# Patient Record
Sex: FEMALE | Race: OTHER | Employment: UNEMPLOYED | URBAN - METROPOLITAN AREA
[De-identification: names, ages, dates, MRNs, and addresses within clinical notes are randomized per-mention and may not be internally consistent; named-entity substitution may affect disease eponyms.]

---

## 2021-04-01 ENCOUNTER — HOSPITAL ENCOUNTER (INPATIENT)
Age: 21
LOS: 7 days | Discharge: HOME | DRG: 751 | End: 2021-04-08
Attending: PSYCHIATRY & NEUROLOGY | Admitting: PSYCHIATRY & NEUROLOGY
Payer: COMMERCIAL

## 2021-04-01 DIAGNOSIS — F33.2 SEVERE EPISODE OF RECURRENT MAJOR DEPRESSIVE DISORDER, WITHOUT PSYCHOTIC FEATURES (HCC): ICD-10-CM

## 2021-04-01 PROBLEM — F32.A DEPRESSION: Status: ACTIVE | Noted: 2021-04-01

## 2021-04-01 PROCEDURE — 65220000003 HC RM SEMIPRIVATE PSYCH

## 2021-04-01 PROCEDURE — 74011000250 HC RX REV CODE- 250: Performed by: PSYCHIATRY & NEUROLOGY

## 2021-04-01 PROCEDURE — 74011250637 HC RX REV CODE- 250/637: Performed by: PSYCHIATRY & NEUROLOGY

## 2021-04-01 RX ORDER — HYDROXYZINE 25 MG/1
50 TABLET, FILM COATED ORAL
Status: DISCONTINUED | OUTPATIENT
Start: 2021-04-01 | End: 2021-04-08 | Stop reason: HOSPADM

## 2021-04-01 RX ORDER — HALOPERIDOL 5 MG/ML
5 INJECTION INTRAMUSCULAR
Status: DISCONTINUED | OUTPATIENT
Start: 2021-04-01 | End: 2021-04-08 | Stop reason: HOSPADM

## 2021-04-01 RX ORDER — OLANZAPINE 5 MG/1
5 TABLET ORAL
Status: DISCONTINUED | OUTPATIENT
Start: 2021-04-01 | End: 2021-04-08 | Stop reason: HOSPADM

## 2021-04-01 RX ORDER — LORAZEPAM 2 MG/ML
1 INJECTION INTRAMUSCULAR
Status: DISCONTINUED | OUTPATIENT
Start: 2021-04-01 | End: 2021-04-08 | Stop reason: HOSPADM

## 2021-04-01 RX ORDER — ACETAMINOPHEN 325 MG/1
650 TABLET ORAL
Status: DISCONTINUED | OUTPATIENT
Start: 2021-04-01 | End: 2021-04-08 | Stop reason: HOSPADM

## 2021-04-01 RX ORDER — BENZTROPINE MESYLATE 1 MG/1
1 TABLET ORAL
Status: DISCONTINUED | OUTPATIENT
Start: 2021-04-01 | End: 2021-04-08 | Stop reason: HOSPADM

## 2021-04-01 RX ORDER — DIPHENHYDRAMINE HYDROCHLORIDE 50 MG/ML
50 INJECTION, SOLUTION INTRAMUSCULAR; INTRAVENOUS
Status: DISCONTINUED | OUTPATIENT
Start: 2021-04-01 | End: 2021-04-08 | Stop reason: HOSPADM

## 2021-04-01 RX ORDER — TRAZODONE HYDROCHLORIDE 50 MG/1
50 TABLET ORAL
Status: DISCONTINUED | OUTPATIENT
Start: 2021-04-01 | End: 2021-04-08 | Stop reason: HOSPADM

## 2021-04-01 RX ORDER — ADHESIVE BANDAGE
30 BANDAGE TOPICAL DAILY PRN
Status: DISCONTINUED | OUTPATIENT
Start: 2021-04-01 | End: 2021-04-08 | Stop reason: HOSPADM

## 2021-04-01 RX ADMIN — BACITRACIN ZINC NEOMYCIN SULFATE POLYMYXIN B SULFATE: 400; 3.5; 5 OINTMENT TOPICAL at 17:27

## 2021-04-01 RX ADMIN — HYDROXYZINE HYDROCHLORIDE 50 MG: 25 TABLET, FILM COATED ORAL at 23:04

## 2021-04-01 RX ADMIN — ACETAMINOPHEN 650 MG: 325 TABLET ORAL at 12:38

## 2021-04-01 RX ADMIN — TRAZODONE HYDROCHLORIDE 50 MG: 50 TABLET ORAL at 23:04

## 2021-04-01 RX ADMIN — ACETAMINOPHEN 650 MG: 325 TABLET ORAL at 21:46

## 2021-04-01 NOTE — PROGRESS NOTES
GROUP THERAPY PROGRESS NOTE      Fnu Fouzia Bunch was not present for medication group.       Nasir Garcia, PHARMD, BCPS

## 2021-04-01 NOTE — PROGRESS NOTES
[facility] Admission Pharmacy Medication Reconciliation     Information obtained from: Patient interview  RxQuery data available1:No    Comments/recommendations:    1) Reports she used to take vitamin D and fish oil, unknown doses. Has not taken them recently. 2) Medication changes (since last review): None     1RxQuery pharmacy benefit data reflects medications filled and processed through the patient's insurance, however                this data does NOT capture whether the medication was picked up or is currently being taken by the patient. Total Time Spent: 5 minutes    Past Medical History/Disease States:  No past medical history on file. Patient allergies:    Allergies as of 04/01/2021    (No Known Allergies)       Prior to admission medications:   None        Thank you,  [facility], [address] [place] [address] Avenue Nw: [phone] ([id number])  Pharmacy: [phone] ([id number])

## 2021-04-01 NOTE — GROUP NOTE
DANA  GROUP DOCUMENTATION INDIVIDUAL Group Therapy Note Date: 4/1/2021 Group Start Time: 1100 Group End Time: 1200 Group Topic: Topic Group [facility] [place] 3 ACUTE BEHAV [facility] [place], [address] [facility] GROUP DOCUMENTATION GROUP Group Therapy Note Attendees: 6 Attendance: Did not attend Patient's Goal: Interventions/techniques: 
Cindi Meneses
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
## 2021-04-01 NOTE — GROUP NOTE
DANA  GROUP DOCUMENTATION INDIVIDUAL Group Therapy Note Date: 4/1/2021 Group Start Time: 1500 Group End Time: 5348 Group Topic: Recreational/Music Therapy [facility] [place] [address] ACUTE BEHAV [facility], [address] GROUP DOCUMENTATION GROUP Group Therapy Note Attendees: 6 Attendance: Attended Patient's Goal:  To concentrate on selected task Interventions/techniques: Supported-crafts,games,music Follows Directions: Followed directions Interactions: minimal-pleasant upon approach Mental Status: Calm Behavior/appearance: Attentive, Cooperative and Needed prompting Goals Achieved: Actively participated with encouragement Additional Notes:   
 
Amie Painting

## 2021-04-01 NOTE — GROUP NOTE
DANA  GROUP DOCUMENTATION INDIVIDUAL Group Therapy Note Date: 4/1/2021 Group Start Time: 0900 Group End Time: 1000 Group Topic: Topic Group [facility] [place] 3 ACUTE BEHAV [facility] [place], [address] GROUP DOCUMENTATION GROUP Group Therapy Note Attendees: 4 Attendance: Did not attend Patient's Goal: Interventions/techniques:  
Gautam Gould

## 2021-04-01 NOTE — CONSULTS
Hospitalist Consultation Note    NAME:  Sharmila Diaz   :   2000   MRN:   [id number]   Room Number: 030  @ [facility]     ATTENDING: Sadie Moreno MD  PCP:  Rachel Baltazar MD    Date/Time:  2021 4:24 PM      Recommendations/Plan:       Active Problems:    Depression (2021)         #Chart review from care everywhere admit he was in hospital  -CBC without any white count hemoglobin and hemoglobin stable  -UDS negative  -General chemistry unremarkable  -Serum pregnancy is negative  -Alcohol less than less than 10  -UA with small blood and? Bacteria with epithelial cells suspected a good sample recommend repeating it  -EKG result not available but      #Depression with suicidal ideation    -Psychiatric treatment and management of health issues,Defer to psychiatrist for further management.  -Continue home meds. -Medically stable at this time. We will follow up on a p.r.n. basis.  -No VTE prophylaxis indicated or warranted at this time. Subjective:   REQUESTING PHYSICIAN:  REASON FOR CONSULT:      Fnu is a 23 y.o.  American female who I was asked to see for medical clearance. Per chart review patient presented to [facility] ED via father due to intense has ideation bedside kill self. Patient per chart review seems to feel very useless and it stating **it is a hole that is deeper and deeper with life being painful due to her observing all the negative things in life**    Patient denied any active chest pain shortness of breath belly pain fever cough history of drug use            No past medical history on file. No past surgical history on file. Social History     Tobacco Use    Smoking status: Not on file   Substance Use Topics    Alcohol use: Not on file      No family history on file.     No Known Allergies   Prior to Admission medications    Not on File       REVIEW OF SYSTEMS:     Total of 12 systems reviewed as follows: I am not able to complete the review of systems because: The patient is intubated and sedated    The patient has altered mental status due to his acute medical problems    The patient has baseline aphasia from prior stroke(s)    The patient has baseline dementia and is not reliable historian                 POSITIVE= underlined text  Negative = text not underlined  General:  fever, chills, sweats, generalized weakness, weight loss/gain,      loss of appetite   Eyes:    blurred vision, eye pain, loss of vision, double vision  ENT:    rhinorrhea, pharyngitis   Respiratory:   cough, sputum production, SOB, wheezing, MARIE, pleuritic pain   Cardiology:   chest pain, palpitations, orthopnea, PND, edema, syncope   Gastrointestinal:  abdominal pain , N/V, dysphagia, diarrhea, constipation, bleeding   Genitourinary:  frequency, urgency, dysuria, hematuria, incontinence   Muskuloskeletal :  arthralgia, myalgia   Hematology:  easy bruising, nose or gum bleeding, lymphadenopathy   Dermatological: rash, ulceration, pruritis   Endocrine:   hot flashes or polydipsia   Neurological:  headache, dizziness, confusion, focal weakness, paresthesia,     Speech difficulties, memory loss, gait disturbance  Psychological: Feelings of anxiety, depression, agitation    Objective:   VITALS:    Visit Vitals  /78 (BP 1 Location: Left upper arm, BP Patient Position: At rest)   Pulse 80   Temp 98 °F (36.7 °C)   Resp 18   SpO2 99%     Temp (24hrs), Av °F (36.7 °C), Min:98 °F (36.7 °C), Max:98 °F (36.7 °C)      PHYSICAL EXAM:   General:    Alert, cooperative, no distress, appears stated age. HEENT: Atraumatic, anicteric sclerae, pink conjunctivae     No oral ulcers, mucosa moist, throat clear  Neck:  Supple, symmetrical,  thyroid: non tender  Lungs:   Clear to auscultation bilaterally. No Wheezing or Rhonchi. No rales. Chest wall:  No tenderness  No Accessory muscle use.   Heart:   Regular  rhythm,  No  murmur   No edema  Abdomen: Soft, non-tender. Not distended. Bowel sounds normal  Extremities: No cyanosis. No clubbing  Skin:     Not pale. Not Jaundiced  No rashes   Psych:   Not anxious or agitated. Neurologic: EOMs intact. No facial asymmetry. No aphasia or slurred speech. Symmetrical strength, Alert and oriented X 4.     _______________________________________________________________________  Care Plan discussed with:    Comments   Patient x    Family      RN x    Care Manager                    Consultant:      ____________________________________________________________________  TOTAL TIME:     15 mins    Comments    x Reviewed previous records   >50% of visit spent in counseling and coordination of care x Discussion with patient and/or family and questions answered       Critical Care Provided     Minutes non procedure based  ________________________________________________________________________  Signed: Malgorzata Loomis MD      Procedures: see electronic medical records for all procedures/Xrays and details which were not copied into this note but were reviewed prior to creation of Plan. LAB DATA REVIEWED:    No results found for this or any previous visit (from the past 24 hour(s)).     _____________________________  Hospitalist: Malgorzata Loomis MD
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
## 2021-04-02 LAB
CHOLEST SERPL-MCNC: 170 MG/DL
GLUCOSE P FAST SERPL-MCNC: 84 MG/DL (ref 65–100)
HDLC SERPL-MCNC: 84 MG/DL
HDLC SERPL: 2 {RATIO} (ref 0–5)
LDLC SERPL CALC-MCNC: 78.4 MG/DL (ref 0–100)
LIPID PROFILE,FLP: NORMAL
TRIGL SERPL-MCNC: 38 MG/DL (ref ?–150)
TSH SERPL DL<=0.05 MIU/L-ACNC: 1.84 UIU/ML (ref 0.36–3.74)
VLDLC SERPL CALC-MCNC: 7.6 MG/DL

## 2021-04-02 PROCEDURE — 74011250637 HC RX REV CODE- 250/637: Performed by: PSYCHIATRY & NEUROLOGY

## 2021-04-02 PROCEDURE — 65220000003 HC RM SEMIPRIVATE PSYCH

## 2021-04-02 PROCEDURE — 84443 ASSAY THYROID STIM HORMONE: CPT

## 2021-04-02 PROCEDURE — 82947 ASSAY GLUCOSE BLOOD QUANT: CPT

## 2021-04-02 PROCEDURE — 80061 LIPID PANEL: CPT

## 2021-04-02 PROCEDURE — 36415 COLL VENOUS BLD VENIPUNCTURE: CPT

## 2021-04-02 RX ORDER — LAMOTRIGINE 25 MG/1
25 TABLET ORAL DAILY
Status: DISCONTINUED | OUTPATIENT
Start: 2021-04-02 | End: 2021-04-08 | Stop reason: HOSPADM

## 2021-04-02 RX ADMIN — HYDROXYZINE HYDROCHLORIDE 50 MG: 25 TABLET, FILM COATED ORAL at 23:39

## 2021-04-02 RX ADMIN — TRAZODONE HYDROCHLORIDE 50 MG: 50 TABLET ORAL at 23:39

## 2021-04-02 RX ADMIN — ACETAMINOPHEN 650 MG: 325 TABLET ORAL at 16:46

## 2021-04-02 RX ADMIN — LAMOTRIGINE 25 MG: 25 TABLET ORAL at 15:16

## 2021-04-02 RX ADMIN — BACITRACIN ZINC NEOMYCIN SULFATE POLYMYXIN B SULFATE: 400; 3.5; 5 OINTMENT TOPICAL at 09:03

## 2021-04-02 RX ADMIN — ACETAMINOPHEN 650 MG: 325 TABLET ORAL at 09:03

## 2021-04-02 NOTE — PROGRESS NOTES
Problem: Suicide  Goal: *STG: Remains safe in hospital  Outcome: Progressing Towards Goal  Goal: *STG/LTG: Complies with medication therapy  Outcome: Progressing Towards Goal   [id number] Report received from [address] [place] [address].     0900 Patient presented to medication window. Patient is calm and cooperative with assessment. Patient stated, \"not good\" when asked how she was doing today. AAOx4. Speech clear. Resp. Even and unlabored. NAD noted. Skin WNL for race except bruising noted to several shoulders and head. Heading abrasion to left elbow. Ambulates with steady gait without the use of assistive devices. Patient denies SI/HI. Patient states, \"not today\" when asked about AVH. Patient rates anxiety 5 at this time and depression a 10 at this time. Patient reports 9/10 HA at this time. Patient medicated with Tylenol. No other needs, wants, or concerns voiced at this time. Will continue to monitor. 1008 Patient laying in bed with eyes closed. NAD noted. [phone] Patient laying in bed with eyes closed. NAD noted. 1600 Patient laying in bed with eyes closed. NAd noted. Patient slept most of the day and remained in her room isolated to herself.

## 2021-04-02 NOTE — GROUP NOTE
DANA  GROUP DOCUMENTATION INDIVIDUAL Group Therapy Note Date: 4/2/2021 Group Start Time: 1400 Group End Time: 1500 Group Topic: Recreational/Music Therapy [facility] 3 ACUTE BEHAV [facility] [place], [address] [facility] GROUP DOCUMENTATION GROUP Group Therapy Note Attendees: 6 Attendance: Did not attend Patient's Goal: Interventions/techniques:Donita Jacinto

## 2021-04-02 NOTE — PROGRESS NOTES
Problem: Discharge Planning  Goal: *Knowledge of medication management  Outcome: Progressing Towards Goal  Note: Patient verbalizes understanding of medication regimen. Patient is taking medications as prescribed. Goal: *Knowledge of discharge instructions  Outcome: Progressing Towards Goal  Note: Patient verbalizes understanding of goals for treatment and safe discharge. Problem: Discharge Planning  Goal: *Discharge to safe environment  Outcome: Not Progressing Towards Goal  Note: Patient does not identify home as a safe environment. Patient to explore other discharge options.

## 2021-04-02 NOTE — PROGRESS NOTES
Laboratory monitoring for mood stabilizer and antipsychotics:    Recommended baseline monitoring has been completed based on this patient's current medication regimen. The following labs were completed at transferring facility: CBC, CMP, UDS, UA, HCG. Please see transfer paperwork in paper chart/scanned in media tab/Care Everywhere for details. The patient is currently taking the following medication(s):   Current Facility-Administered Medications   Medication Dose Route Frequency    Weight & height needed  1 Each Other ONCE    lamoTRIgine (LaMICtal) tablet 25 mg  25 mg Oral DAILY    neomycin-bacitracin-polymyxin (NEOSPORIN) ointment   Topical BID       Renal Function, Hepatic Function and Chemistry  Please see transfer paperwork in paper chart/scanned in media tab/Care Everywhere for details. Lab Results   Component Value Date/Time    Glucose 84 04/02/2021 05:32 AM       Lab Results   Component Value Date/Time    Glucose 84 04/02/2021 05:32 AM       Hematology  Please see transfer paperwork in paper chart/scanned in media tab/Care Everywhere for details. Lipids  Lab Results   Component Value Date/Time    Cholesterol, total 170 04/02/2021 05:32 AM    HDL Cholesterol 84 04/02/2021 05:32 AM    LDL, calculated 78.4 04/02/2021 05:32 AM    Triglyceride 38 04/02/2021 05:32 AM    CHOL/HDL Ratio 2.0 04/02/2021 05:32 AM       Thyroid Function    Lab Results   Component Value Date/Time    TSH 1.84 04/02/2021 05:32 AM     Vitals  Visit Vitals  /65 (BP 1 Location: Right arm, BP Patient Position: Sitting)   Pulse 82   Temp 98.4 °F (36.9 °C)   Resp 16   SpO2 96%       Pregnancy Test  Please see transfer paperwork in paper chart/scanned in media tab/Care Everywhere for details.      NICK Enciso, BCPS  [phone] (pharmacy)

## 2021-04-02 NOTE — PROGRESS NOTES
Behavioral Services  Medicare Certification Upon Admission    I certify that this patient's inpatient psychiatric hospital admission is medically necessary for:    [x] (1) Treatment which could reasonably be expected to improve this patient's condition,       [x] (2) Or for diagnostic study;     AND     [x](2) The inpatient psychiatric services are provided while the individual is under the care of a physician and are included in the individualized plan of care.     Estimated length of stay/service 5 - 7 days     Plan for post-hospital care per social work    Electronically signed by Renny Escobedo MD on 4/2/2021 at 1:55 PM

## 2021-04-02 NOTE — GROUP NOTE
DANA  GROUP DOCUMENTATION INDIVIDUAL Group Therapy Note Date: 4/2/2021 Group Start Time: 1000 Group End Time: 1100 Group Topic: Topic Group [facility] [place] 3 ACUTE BEHAV [facility] [place], [address] [facility] GROUP DOCUMENTATION GROUP Group Therapy Note Attendees: 5 Attendance: Did not attend Patient's Goal: Interventions/techniques Weyman Mend
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
## 2021-04-03 PROBLEM — F32.A DEPRESSION: Status: RESOLVED | Noted: 2021-04-01 | Resolved: 2021-04-03

## 2021-04-03 PROBLEM — F33.2 MDD (MAJOR DEPRESSIVE DISORDER), RECURRENT EPISODE, SEVERE (HCC): Status: ACTIVE | Noted: 2021-04-03

## 2021-04-03 PROCEDURE — 65220000003 HC RM SEMIPRIVATE PSYCH

## 2021-04-03 PROCEDURE — 74011250637 HC RX REV CODE- 250/637: Performed by: PSYCHIATRY & NEUROLOGY

## 2021-04-03 PROCEDURE — 99232 SBSQ HOSP IP/OBS MODERATE 35: CPT | Performed by: PSYCHIATRY & NEUROLOGY

## 2021-04-03 RX ADMIN — ACETAMINOPHEN 650 MG: 325 TABLET ORAL at 14:37

## 2021-04-03 RX ADMIN — TRAZODONE HYDROCHLORIDE 50 MG: 50 TABLET ORAL at 22:28

## 2021-04-03 RX ADMIN — ACETAMINOPHEN 650 MG: 325 TABLET ORAL at 23:21

## 2021-04-03 RX ADMIN — ACETAMINOPHEN 650 MG: 325 TABLET ORAL at 04:15

## 2021-04-03 RX ADMIN — LAMOTRIGINE 25 MG: 25 TABLET ORAL at 09:01

## 2021-04-03 RX ADMIN — HYDROXYZINE HYDROCHLORIDE 50 MG: 25 TABLET, FILM COATED ORAL at 23:21

## 2021-04-03 RX ADMIN — BACITRACIN ZINC NEOMYCIN SULFATE POLYMYXIN B SULFATE: 400; 3.5; 5 OINTMENT TOPICAL at 16:23

## 2021-04-03 RX ADMIN — BACITRACIN ZINC NEOMYCIN SULFATE POLYMYXIN B SULFATE: 400; 3.5; 5 OINTMENT TOPICAL at 08:00

## 2021-04-03 NOTE — PROGRESS NOTES
Problem: Suicide  Goal: *STG: Remains safe in hospital  4/3/2021 0934 by Jessica Hendricks RN  Outcome: Progressing Towards Goal  4/3/2021 0933 by Jessica Hendricks RN  Outcome: Progressing Towards Goal  Goal: *STG/LTG: Complies with medication therapy  Outcome: Progressing Towards Goal

## 2021-04-03 NOTE — BH NOTES
Pt is alert, oriented, quiet, withdrawn with a flat affect. There are no noted behavioral issues at this time. Pt denies all pain and discomfort. She is medication and med compliant. Pt denies all SI/HI,AVH. She speaks very quietly. No other issues have been noted at this time.

## 2021-04-03 NOTE — PROGRESS NOTES
Problem: Suicide  Goal: *STG: Remains safe in hospital  Outcome: Progressing Towards Goal  Goal: *STG: Seeks staff when feelings of self harm or harm towards others arise  Outcome: Progressing Towards Goal  Problem: Depressed Mood (Adult/Pediatric)  Goal: *STG: Participates in treatment plan  Outcome: Progressing Towards Goal   Pt is anxious and cooperative. Isolative to self and room. Appears sad and depressed. Denies s/I,h/I and a/v hallucinations. Spent the evening in her room sitting quietly. 2330 Pt reports anxiety and sleeplessness. Atarax and Trazodone given with good results.
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
## 2021-04-04 ENCOUNTER — HOSPITAL ENCOUNTER (OUTPATIENT)
Dept: CT IMAGING | Age: 21
Discharge: HOME | End: 2021-04-04
Attending: PSYCHIATRY & NEUROLOGY

## 2021-04-04 PROCEDURE — 65220000003 HC RM SEMIPRIVATE PSYCH

## 2021-04-04 PROCEDURE — 70450 CT HEAD/BRAIN W/O DYE: CPT

## 2021-04-04 PROCEDURE — 74011250637 HC RX REV CODE- 250/637: Performed by: PSYCHIATRY & NEUROLOGY

## 2021-04-04 PROCEDURE — 99232 SBSQ HOSP IP/OBS MODERATE 35: CPT | Performed by: PSYCHIATRY & NEUROLOGY

## 2021-04-04 RX ORDER — DICLOFENAC SODIUM 10 MG/G
2 GEL TOPICAL 2 TIMES DAILY
Status: DISCONTINUED | OUTPATIENT
Start: 2021-04-04 | End: 2021-04-08 | Stop reason: HOSPADM

## 2021-04-04 RX ADMIN — DICLOFENAC SODIUM 2 G: 10 GEL TOPICAL at 16:55

## 2021-04-04 RX ADMIN — ACETAMINOPHEN 650 MG: 325 TABLET ORAL at 08:05

## 2021-04-04 RX ADMIN — HYDROXYZINE HYDROCHLORIDE 50 MG: 25 TABLET, FILM COATED ORAL at 11:00

## 2021-04-04 RX ADMIN — TRAZODONE HYDROCHLORIDE 50 MG: 50 TABLET ORAL at 21:43

## 2021-04-04 RX ADMIN — BACITRACIN ZINC NEOMYCIN SULFATE POLYMYXIN B SULFATE: 400; 3.5; 5 OINTMENT TOPICAL at 08:07

## 2021-04-04 RX ADMIN — BACITRACIN ZINC NEOMYCIN SULFATE POLYMYXIN B SULFATE: 400; 3.5; 5 OINTMENT TOPICAL at 16:56

## 2021-04-04 RX ADMIN — LAMOTRIGINE 25 MG: 25 TABLET ORAL at 08:06

## 2021-04-04 RX ADMIN — HYDROXYZINE HYDROCHLORIDE 50 MG: 25 TABLET, FILM COATED ORAL at 21:42

## 2021-04-04 NOTE — BH NOTES
GROUP THERAPY PROGRESS NOTE    Patient did not participate in Coping Skills group.      Lane Stewart MSW
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
## 2021-04-04 NOTE — PROGRESS NOTES
Problem: Suicide  Goal: *STG: Remains safe in hospital  Outcome: Progressing Towards Goal  Goal: *STG: Seeks staff when feelings of self harm or harm towards others arise  Outcome: Progressing Towards Goal  Goal: *STG/LTG: Complies with medication therapy  Outcome: Progressing Towards Goal  Goal: *STG/LTG: No longer expresses self destructive or suicidal thoughts  Outcome: Progressing Towards Goal   Pt is visible on the unit this evening. Social with select peers. Denies s/I, h/I and a/v hallucinations. Denies depression and anxiety but appears sad but less withdrawn this evening. Pt spent the evening in the lounge watching tv and eating snacks with peers. Reports her head is feeling better and she is in less pain this evening. 2200 Pt continued to sit in the lounge and socialize. Reports less anxiety but does report that she is having difficulty sleeping. Trazodone given with good results. 5630 Pt observed in bed eyes closed with even unlabored breathing. Continued to rest for long intervals with no signs of distress or discomfort for 7 hrs. Maintained on q15 min safety checks.
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
## 2021-04-04 NOTE — BH NOTES
Hourly rounds have been comleted to assure patient safety and attend to patient care needs. Pt is alert, oriented x 4, quiet, withdrawn with a flat affect. There are no noted behavioral issues at this time. Pt rports headache pain and was medicated with tylenol. She was also given Diclofenac gel for pain. She is medication and med compliant. Pt denies all SI/HI,AVH. She speaks very quietly. Pt was escorted to CT to complete scan without issues.  Monitoring will continue

## 2021-04-05 PROCEDURE — 65220000003 HC RM SEMIPRIVATE PSYCH

## 2021-04-05 PROCEDURE — 74011250637 HC RX REV CODE- 250/637: Performed by: PSYCHIATRY & NEUROLOGY

## 2021-04-05 RX ADMIN — LAMOTRIGINE 25 MG: 25 TABLET ORAL at 08:00

## 2021-04-05 RX ADMIN — DICLOFENAC SODIUM 2 G: 10 GEL TOPICAL at 18:14

## 2021-04-05 RX ADMIN — TRAZODONE HYDROCHLORIDE 50 MG: 50 TABLET ORAL at 21:42

## 2021-04-05 RX ADMIN — DICLOFENAC SODIUM 2 G: 10 GEL TOPICAL at 09:44

## 2021-04-05 RX ADMIN — BACITRACIN ZINC NEOMYCIN SULFATE POLYMYXIN B SULFATE: 400; 3.5; 5 OINTMENT TOPICAL at 09:42

## 2021-04-05 RX ADMIN — ACETAMINOPHEN 650 MG: 325 TABLET ORAL at 09:46

## 2021-04-05 RX ADMIN — HYDROXYZINE HYDROCHLORIDE 50 MG: 25 TABLET, FILM COATED ORAL at 21:42

## 2021-04-05 RX ADMIN — BACITRACIN ZINC NEOMYCIN SULFATE POLYMYXIN B SULFATE: 400; 3.5; 5 OINTMENT TOPICAL at 18:14

## 2021-04-05 NOTE — PROGRESS NOTES
Problem: Suicide  Goal: *STG: Remains safe in hospital  Outcome: Progressing Towards Goal  Problem: Depressed Mood (Adult/Pediatric)  Goal: *STG: Participates in treatment plan  Outcome: Progressing Towards Goal   1930 rec'd pt awake A&Ox4. Pt denies s/I h/I and a/v hallucination. Pt does reports depression 6/10 and anxiety 4/10. Pt reports that she had some sadness today although she was feeling better the day before. Emotional support provided. Encouraged pt to discuss her feelings. Pt reports that she was sitting in her room thinking and she became anxious when she relives the events that occurred before she came to the hospital. Pt reports that she did not want to call authorizes because it would hurt her father because her mom is in shelter and he is the only one there to support her and her 3 yr old sister. Pt was sad and depressed stating that she does not feel she will hurt herself because she tried in 2020 before the pandemic by \"cutting my fingers, but they healed quickly\" pt reports she was practicing but she could not go through hurting herself. Pt also went on to state that she is more afraid of hurting someone or something else and not herself. Pt assessed for h/I at that time. She denies s/I, h/I.  Pt continued to express her feelings stating that is it difficult to find someone to talk to. Pt encouraged to speak with her psychiatrist and therapist regarding her thoughts and feelings as well. Pt  also states that she does not want to go home now because she hates to hear her brother and fathers' voice even on the phone now. Pt was slightly calmer after expressing herself. Encouraged to spent time in the lounge with peers to help manage intrusive negative thoughts she reports having when alone in her room. Pt stated that she is trying to manage her anger because when she gets angry she hurts other things and not herself. Pt reports hurting her cat badly 2X by swinging him by the tail.  She began smiling and stating \"but he always come back to me because I feed him\" Pt also reports that it is difficult to let other close to her because she thinks she may hurt them. Pt states she does not let her 3 yr old sister come close to her because she does not trust herself. Pt would not elaborate further. Encouraged pt to continue to express her feeling to staff and mostly to her psychiatrist and therapist. Pt is in agreement with this plan to keep her safe on the unit. Pt continued to speak for some time before she was slightly calmer. 2130 C/o anxiety and sleeplessness. Atarax and Trazodone given with good results. 6009 Administered medications effective. Pt observed in bed eyes closed with even unlabored breathing. Continued to rest for long intervals with no signs of distress or discomfort for 8 hrs. Maintained on q15 min safety checks.

## 2021-04-05 NOTE — BH NOTES
GROUP THERAPY PROGRESS NOTE    Patient is participating in a Coping Skills group. Group time:  45 minutes     Personal goal for participation: Discuss anxiety triggers and how to cope with anxiety     Goal orientation: Personal    Group therapy participation: passive    Therapeutic interventions reviewed and discussed: Patients completed worksheet and discussed triggers for anxiety, physical symptoms of anxiety, ruminating and racing thoughts experienced when anxious, and how to cope with anxiety. Patients processed the way anxiety has impacted their decision-making process and exasperated mental health needs. Impression of participation: Pt presented with depressed mood, flat affect, appropriate eye contact. Pt showed active listening during group conversation by nodding her head, left group early to meet with treatment team. Pt was not present in group to complete worksheet/activity. SW gave pt the activity to complete independently following group.     JOCELYN Olvera

## 2021-04-05 NOTE — GROUP NOTE
DANA  GROUP DOCUMENTATION INDIVIDUAL Group Therapy Note Date: 4/5/2021 Group Start Time: 1400 Group End Time: 1500 Group Topic: Recreational/Music Therapy [address] [facility] 3 ACUTE BEHAV [facility], [place], [address], [address]. \Hospital Has a New Name and Outlook.\"" GROUP DOCUMENTATION GROUP Group Therapy Note Attendees: 10 Attendance: Did not attend Patient's Goal: Interventions/techniques: 
 
Akosua Carcamo
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
## 2021-04-05 NOTE — BH NOTES
GROUP THERAPY PROGRESS NOTE    Patient is participating in a Coping Skills group. Group time: 45 minutes     Personal goal for participation: Discuss patients strengths, positive aspects of life and positive affirmations to increase self-esteem and promote healing. Goal orientation: Personal    Group therapy participation: minimal    Therapeutic interventions reviewed and discussed: Completion of My Strengths and Qualities activity sheet. Group discussion on personal patients strengths including things they are good at, ways theyve helped others, what makes them unique, and challenges they have overcome. Discussion regarding positive things others say about patients and positive words patient speak over themselves. Group discussed the benefit and power of positive thinking and self-talk and its role in improving mental health challenges. Group discussed having a positive mind set as a helpful coping skill. Impression of participation: Pt presented with flat affect, depressed mood, guarded. Pt stated she is good at [employer]", had poor concentration at times. Pt completed worksheet but declined to name other strengths.     JOCELYN Vail

## 2021-04-05 NOTE — BH NOTES
GROUP THERAPY PROGRESS NOTE    Patient is participating in Discharge Planning Group. Group time: 50 minutes     Personal goal for participation: Process feelings related to discharge and/or feelings/goals for today. Goal orientation: Personal    Group therapy participation: active    Therapeutic interventions reviewed and discussed: Group discussion was focused on discharge plans and anxiety related to this. Group members discussed discharge plans and outpatient support. Patients discussed their goals for today as well and what they are working on with treatment team to get ready for discharge. Patients discussed changes they desire to make in their lives to improve their mental health. Impression of participation: Pt presented with depressed mood, poor insight, calm and cooperative, interacted appropriately with others. Pt had poor insight, states she is ready to discharge but that she is more depressed and has more intrusive thoughts than upon admission. Pt states she does not want to return home to be with family. Pt identified wanting outpatient therapeutic support. Pt's goals for today is to \"get through the day\".     JOCELYN White
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
## 2021-04-05 NOTE — GROUP NOTE
DANA  GROUP DOCUMENTATION INDIVIDUAL Group Therapy Note Date: 4/5/2021 Group Start Time: 1000 Group End Time: 1100 Group Topic: Topic Group 137 [facility] 3 ACUTE BEHAV [facility], [address] GROUP DOCUMENTATION GROUP Group Therapy Note Attendees: 9 Attendance: Attended Patient's Goal:  To participate in healthy relationships AppointmentCity game Interventions/techniques: Supported-things pertaining to relationships Follows Directions: Followed directions Interactions: Interacted appropriately Mental Status: Calm Behavior/appearance: Attentive, Cooperative and Needed prompting Goals Achieved: Able to engage in interactions, Able to listen to others, Discussed coping and Discussed self-esteem issues Additional Notes:   
 
Jakob Dennis

## 2021-04-05 NOTE — BH NOTES
Behavioral Health Treatment Team Note     Patient goal(s) for today: continue taking meds as prescribe, engage in unit activities, participate in hygiene/ADLs, prepare for discharge, follow directions from staff  Treatment team focus/goals: continue adjusting meds as needed, discharge planning, update support system    Progress note: Patient was alert and oriented during treatment team. She reports that she is hoping to go home soon but reports that she does not want to go home or live close to her parent's home. She discussed her shadows/monsters that she sees with the psychiatrist. Medication is being tolerated well with no side-effects. Discussed switching trazodone to zyprexa vs prazosin. Dr Rossie Dakin to change medication to help with dreams as well as hallucinations in the evenings. Patient reports feeling terrible yesterday and crying a lot. She denies any current thoughts of wanting ot harm herself or others. LOS:  4  Expected LOS: TBD    Insurance info/prescription coverage:  Self-Pay, Medicaid pending  Date of last family contact:  4/2/21- [facility] Father Joice Merlin [phone]  Family requesting physician contact today:  no  Discharge plan:  TBD  Guns in the home:  no   Outpatient provider(s):   To be linked    Participating treatment team members: Dr Soheila Hall MD

## 2021-04-06 PROCEDURE — 74011250637 HC RX REV CODE- 250/637: Performed by: PSYCHIATRY & NEUROLOGY

## 2021-04-06 PROCEDURE — 65220000003 HC RM SEMIPRIVATE PSYCH

## 2021-04-06 RX ORDER — OLANZAPINE 2.5 MG/1
2.5 TABLET ORAL EVERY EVENING
Status: DISCONTINUED | OUTPATIENT
Start: 2021-04-06 | End: 2021-04-08 | Stop reason: HOSPADM

## 2021-04-06 RX ADMIN — HYDROXYZINE HYDROCHLORIDE 50 MG: 25 TABLET, FILM COATED ORAL at 14:13

## 2021-04-06 RX ADMIN — BACITRACIN ZINC NEOMYCIN SULFATE POLYMYXIN B SULFATE: 400; 3.5; 5 OINTMENT TOPICAL at 17:37

## 2021-04-06 RX ADMIN — DICLOFENAC SODIUM 2 G: 10 GEL TOPICAL at 17:37

## 2021-04-06 RX ADMIN — DICLOFENAC SODIUM 2 G: 10 GEL TOPICAL at 09:08

## 2021-04-06 RX ADMIN — LAMOTRIGINE 25 MG: 25 TABLET ORAL at 09:05

## 2021-04-06 RX ADMIN — OLANZAPINE 2.5 MG: 2.5 TABLET, FILM COATED ORAL at 17:36

## 2021-04-06 RX ADMIN — ACETAMINOPHEN 650 MG: 325 TABLET ORAL at 09:07

## 2021-04-06 RX ADMIN — BACITRACIN ZINC NEOMYCIN SULFATE POLYMYXIN B SULFATE: 400; 3.5; 5 OINTMENT TOPICAL at 09:05

## 2021-04-06 NOTE — BH NOTES
GROUP THERAPY PROGRESS NOTE    Patient is participating in Coping Skills group. Group time: 50 minutes    Personal goal for participation: To identify coping skills to combat feeling misunderstood by society and support system    Goal orientation: Personal    Group therapy participation: active    Therapeutic interventions reviewed and discussed: Patients completed a worksheet titled The Masks We THE \[facility]\""[facility] depicting how the world perceives them vs true inner thoughts, feelings, and dialogue. Patients discussed ways in which the they feel misunderstood by the world and how they can more effectively communicate their mental health needs to society and their support systems. Patients identified coping skills to combat disparity between worlds view of patient and their view of self. Patients processed the ways in which wearing a metaphorical mask can be both beneficial and damaging. Impression of participation: Pt presented with euthymic mood, clear speech, logical thought process, calm and cooperative, interacted appropriately with others, proper insight and judgement. Pt completed activity, shared her tendency to wear a metaphorical mask hiding her thoughts of hopelessness, depression and carelessness. Pt shared how she uses a mask to protect herself and to hide behind. Pt processed her desire to express her true feelings in a healthy way and not isolate or hide.      JOCELYN Russ
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
## 2021-04-06 NOTE — PROGRESS NOTES
Patient was selectively social with peers, pleasant with both staff and peers. She denies SI, HI and AVH. She was able to participate in her shift assessment and was calm and cooperative. Good eye contact. No complaints and she denied pain. She appears to have slept well this shift.

## 2021-04-06 NOTE — GROUP NOTE
DANA  GROUP DOCUMENTATION INDIVIDUAL Group Therapy Note Date: 4/6/2021 Group Start Time: 1400 Group End Time: 1500 Group Topic: Recreational/Music Therapy [facility] [address] ACUTE BEHAV [facility] [place], [address] GROUP DOCUMENTATION GROUP Group Therapy Note Attendees: 8 Attendance: Attended Patient's Goal:  To concentrate on selected task Interventions/techniques: Supported-crafts,games,music Follows Directions: Followed directions Interactions: Interacted appropriately Mental Status: Calm Behavior/appearance: Attentive and Cooperative Goals Achieved: Able to engage in interactions and Able to listen to others Additional Notes:   
 
Laina Enciso

## 2021-04-06 NOTE — SUICIDE SAFETY PLAN
SAFETY PLAN    A suicide Safety Plan is a document that supports someone when they are having thoughts of suicide. Warning Signs that indicate a suicidal crisis may be developing: What (situations, thoughts, feelings, body sensations, behaviors, etc.) do you experience that lets you know you are beginning to think about suicide? 1. Feeling hopeless/like I haven't accomplished anything  2. Arguments and bad news on social media  3. Feeling anxious  4. Isolation- staying far away from crowds and in room more    Internal Coping Strategies:  What things can I do (relaxation techniques, hobbies, physical activities, etc.) to take my mind off my problems without contacting another person? 1. Play games   2. Listen to music   3. Self-care: shower, make up, dress up etc.    People and social settings that provide distraction: Who can I call or where can I go to distract me? 1. Name: Shi [place]  Phone: Number is in phone  2. Place: fountain          3. Place: hiking places with big fields    People whom I can ask for help: Who can I call when I need help - for example, friends, family, clergy, someone else? 1. Name: Shi [place]  Phone: Number is in phone    Professionals or [facility] agencies I can contact during a crisis: Who can I call for help - for example, my doctor, my psychiatrist, my psychologist, a mental health provider, a suicide hotline? 1. Clinician Name: Araceli MURPHY  Phone: [phone]      Clinician Pager or Emergency Contact #: [phone]    2. Clinician Name:    Phone:       Clinician Pager or Emergency Contact #:     3. Suicide Prevention Lifeline: 7-870-454-TALK (8654)    4.  [address] [place] [address] Emergency Services -  for example, [facility] suicide hotlineUnited [facility] Medical Centerline: (272) [phone]      Emergency Services Address: Surinder Mojica [address] [place] [address]      Emergency Services Phone: [phone]      Making the environment safe: How can I make my environment (house/apartment/living space) safer? For example, can I remove guns, medications, and other items? 1. Move out of my apartment be more independent  2.  Have more natural light in room
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
## 2021-04-06 NOTE — GROUP NOTE
DANA  GROUP DOCUMENTATION INDIVIDUAL Group Therapy Note Date: 4/6/2021 Group Start Time: 1000 Group End Time: 1100 Group Topic: Topic Group [address]UTE [address] [facility], [address] GROUP DOCUMENTATION GROUP Group Therapy Note Attendees: 10 Attendance: Attended Patient's Goal:  To participate in chair exercise routine Interventions/techniques: Supported-benefits of exercise Follows Directions: Followed directions Interactions: Interacted appropriately Mental Status: Calm Behavior/appearance: Attentive, Cooperative and Needed prompting Goals Achieved: Able to engage in interactions, Able to listen to others, Able to self-disclose and Discussed coping Additional Notes:   
 
Alessandro Shaffer

## 2021-04-07 PROCEDURE — 74011250637 HC RX REV CODE- 250/637: Performed by: PSYCHIATRY & NEUROLOGY

## 2021-04-07 PROCEDURE — 65220000003 HC RM SEMIPRIVATE PSYCH

## 2021-04-07 RX ADMIN — LAMOTRIGINE 25 MG: 25 TABLET ORAL at 09:34

## 2021-04-07 RX ADMIN — BACITRACIN ZINC NEOMYCIN SULFATE POLYMYXIN B SULFATE: 400; 3.5; 5 OINTMENT TOPICAL at 09:35

## 2021-04-07 RX ADMIN — DICLOFENAC SODIUM 2 G: 10 GEL TOPICAL at 09:34

## 2021-04-07 RX ADMIN — DICLOFENAC SODIUM 2 G: 10 GEL TOPICAL at 18:10

## 2021-04-07 RX ADMIN — OLANZAPINE 2.5 MG: 2.5 TABLET, FILM COATED ORAL at 18:09

## 2021-04-07 RX ADMIN — BACITRACIN ZINC NEOMYCIN SULFATE POLYMYXIN B SULFATE: 400; 3.5; 5 OINTMENT TOPICAL at 18:10

## 2021-04-07 NOTE — PROGRESS NOTES
Problem: Suicide  Goal: *STG: Remains safe in hospital  Outcome: Progressing Towards Goal  Goal: *STG/LTG: Complies with medication therapy  Outcome: Progressing Towards Goal     Problem: Depressed Mood (Adult/Pediatric)  Goal: *STG: Participates in treatment plan  Outcome: Progressing Towards Goal  Goal: *STG: Remains safe in hospital  Outcome: Progressing Towards Goal  Goal: *STG: Complies with medication therapy  Outcome: Progressing Towards Goal     Problem: Falls - Risk of  Goal: *Absence of Falls  Description: Document Zahira Fall Risk and appropriate interventions in the flowsheet.   Outcome: Progressing Towards Goal  Note: Fall Risk Interventions:            Medication Interventions: Teach patient to arise slowly

## 2021-04-07 NOTE — BH NOTES
[id number]: Patient attended groups in the dayroom. Patient seen seen talking and playing cards with peers. 6682-8230: Patient med and meal compliant. No voiced concerns. [id number]: Patient to dayroom to attend group. Patient stated that she is feeling better.

## 2021-04-07 NOTE — PROGRESS NOTES
Assumed care of patient after receiving shift report from outgoing nurse. Patient was out and visible on unit. Pt makes good eye contact. A&O x 4. Affect was constricted, mood was depressed. Hygiene is adequate, independent in ADLs. Gait is steady. Appetite patterns WNL. Pt reports improvement in sleep. Denies AHSI/HI/Anxiety. Reports being afraid of the dark because sometimes she sees shadows in her peripheral vision in her room. Endorses Depression but states it is not as bad as when she first arrived on unit. Last BM was 04/06/21. Pt denies pain. Pt encouraged to continue to participate in care. 0000: Pt observed in bed appearing to be asleep    0200: Pt observed in bed appearing to be asleep    0400: Pt observed in bed appearing to be asleep    [id number]: Pt has been observed throughout the night. Total hours slept approximately 9. No s/s of distress noted. Patient has remained safe.

## 2021-04-07 NOTE — PROGRESS NOTES
Problem: Suicide  Goal: *STG: Remains safe in hospital  Outcome: Progressing Towards Goal  Goal: *STG: Attends activities and groups  Outcome: Progressing Towards Goal  Goal: *STG/LTG: Complies with medication therapy  Outcome: Progressing Towards Goal     0700- Verbal shift change report given to Devaughn Coffman  (oncoming nurse) by Claudeen Silk. (offgoing nurse). Report included the following information SBAR, Kardex, MAR and Recent Results. [id number] Patient denies SI/HI/AVH. Patient sitting in day room upon assessment. Patiet denies anxiety and depression. Patient presents as guarded and constricted. Patient is pleasant and cooperative and medication compliant. No further issues noted at this time. [id number] Patient attended and participated in groups. Patient interacting appropriately with peers and staff. Patient intermittently in room and in day room. [id number] Patient tolerated lunch without issue.

## 2021-04-07 NOTE — GROUP NOTE
DANA  GROUP DOCUMENTATION INDIVIDUAL Group Therapy Note Date: 4/7/2021 Group Start Time: 1500 Group End Time: 1198 Group Topic: Recreational/Music Therapy [facility] [place] [address] ACUTE BEHAV [facility] [place], [address] GROUP DOCUMENTATION GROUP Group Therapy Note Attendees: 6 Attendance: Attended Patient's Goal:  To concentrate on selected task Interventions/techniques: Supported-crafts,games,music Follows Directions: Followed directions Interactions: Interacted appropriately Mental Status: Calm Behavior/appearance: Attentive and Cooperative Goals Achieved: Able to engage in interactions and Able to listen to others Additional Notes:  Pleasant-eager participant-good concentration during game Breanna Avila

## 2021-04-07 NOTE — BH NOTES
Behavioral Health Treatment Team Note      Patient goal(s) for today: continue taking meds as prescribe, engage in unit activities, participate in hygiene/ADLs, prepare for discharge, follow directions from staff  Treatment team focus/goals: continue adjusting meds as needed, discharge planning, update support system     Progress note: Patient presented with appropriate eye contact, cooperative attitude, anxious mood, clear speech, linear thought stream, appropriate content. She denied SI/HI. She reports she is better than yesterday and denies any medication issues. Patient provided with information on side effects, per her request. Patient reports that she talked to her dad last night and those talks are going well. Patient clarified that her first name is Katya Rios. Patient requesting to be tested for her blood type. MD attempted to put in order but it was denied. Patient attending groups.  SW discussed utilization of coping skills and practice.       JOSE:  0                       PSGBOISL LOS: 6-7     Insurance info/prescription coverage:  Self-Pay, Medicaid pending  Date of last family contact:  4/2/21- [facility] Vannessa Levin [phone]  Family requesting physician contact today:  no  Discharge plan:  TBD  Guns in the home:  no   Outpatient provider(s):  To be linked to [facility]     Participating treatment team members: JOCELYN Coe, Dr Laron Gomes MD

## 2021-04-07 NOTE — BH NOTES
GROUP THERAPY PROGRESS NOTE    Patient is participating in a Coping Skills Group     Group time: 1 hour    Personal goal for participation: To discuss the impact of negative self-talk on mental health and practice positive affirmations to improve mood and view of self    Goal orientation: Personal    Group therapy participation: active    Therapeutic interventions reviewed and discussed: Patient discussed the impact of negative self-talk on mental health. Patients discussed and wrote down self-defeating statements they make to themselves.  provided psychoeducation on reframing negative-self talk into positive affirmations. Patients completed exercise where they reframed their self-defeating statements into positive affirmations. Patients listened to soothing music and used watercolors to [employer] on and paint a positive affirmation they desire to speak over themselves. Impression of participation:  Pt presented with euthymic mood, clear speech, logical thought process, calm and cooperative, interacted appropriately with others. Pt processed the importance of positive self talk, stated she tends to have negative self talk due to the way she was raised/her environment. Pt completed painting activity, her positive affirmations include how important it is that she lives, that she is beautiful and that things in life will get better. Pt had proper insight and judgement.     JOCELYN Escalera
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
## 2021-04-07 NOTE — GROUP NOTE
DANA  GROUP DOCUMENTATION INDIVIDUAL Group Therapy Note Date: 4/7/2021 Group Start Time: 1100 Group End Time: 1200 Group Topic: Topic Group [address] 3 ACUTE BEHAV [facility] [place], [address] [facility] GROUP DOCUMENTATION GROUP Group Therapy Note Attendees: 4 Attendance: Did not attend Patient's Goal: Interventions/techniques:Donita Jacinto

## 2021-04-07 NOTE — GROUP NOTE
DANA  GROUP DOCUMENTATION INDIVIDUAL Group Therapy Note Date: 4/7/2021 Group Start Time: 0900 Group End Time: 1000 Group Topic: Topic Group [facility] 3 ACUTE BEHAV [facility] [place], [address] GROUP DOCUMENTATION GROUP Group Therapy Note Attendees: 7 Attendance: Attended Patient's Goal:  To participate in ,mental health journey game Interventions/techniques: Supported-choices in recovery Follows Directions: Followed directions Interactions: Interacted appropriately Mental Status: Calm Behavior/appearance: Attentive, Cooperative and Needed prompting Goals Achieved: Able to engage in interactions, Able to listen to others, Able to self-disclose and Discussed coping Additional Notes:   
 
Roxann Jones

## 2021-04-07 NOTE — BH NOTES
GROUP THERAPY PROGRESS NOTE     Patient did not participate in Discharge Planning Group.      Farhana Beard BA, MA Internship Student   Tammy Loera LPC [facility]

## 2021-04-07 NOTE — BH NOTES
GROUP THERAPY PROGRESS NOTE     Patient is participating in Substance abuse/Coping Skills group. Group time: 50 minutes     Personal goal for participation: To understand values and how behaviors impact values     Goal orientation: Personal     Group therapy participation: active   Therapeutic interventions reviewed and discussed: Group discussion of values and how behaviors and actions have been in agreement or disagreement with values. Patients able to share their thoughts on values and value related questions designed for patients to explore their values and beliefs. Impression of participation: Fnu actively participated in group and made appropriate comments. Values that are most important to Fnu are the discipline of science and taking care of the environment. Fnu is very concerned about the future of our planet and the environmental legacy that humanity leaves to the younger generation.      Zaria Cao BA, MA Internship Student  Anna Marie Quintanilla [facility]

## 2021-04-08 VITALS
TEMPERATURE: 98.1 F | OXYGEN SATURATION: 100 % | RESPIRATION RATE: 18 BRPM | DIASTOLIC BLOOD PRESSURE: 61 MMHG | HEIGHT: 65 IN | SYSTOLIC BLOOD PRESSURE: 121 MMHG | BODY MASS INDEX: 20.16 KG/M2 | HEART RATE: 84 BPM | WEIGHT: 121 LBS

## 2021-04-08 PROCEDURE — 74011250637 HC RX REV CODE- 250/637: Performed by: PSYCHIATRY & NEUROLOGY

## 2021-04-08 RX ORDER — TRAZODONE HYDROCHLORIDE 50 MG/1
50 TABLET ORAL
Qty: 30 TAB | Refills: 0 | Status: SHIPPED | OUTPATIENT
Start: 2021-04-08

## 2021-04-08 RX ORDER — OLANZAPINE 2.5 MG/1
2.5 TABLET ORAL EVERY EVENING
Qty: 30 TAB | Refills: 0 | Status: SHIPPED | OUTPATIENT
Start: 2021-04-08

## 2021-04-08 RX ORDER — LAMOTRIGINE 25 MG/1
25 TABLET ORAL DAILY
Qty: 30 TAB | Refills: 0 | Status: SHIPPED | OUTPATIENT
Start: 2021-04-09

## 2021-04-08 RX ORDER — HYDROXYZINE 50 MG/1
50 TABLET, FILM COATED ORAL
Qty: 30 TAB | Refills: 0 | Status: SHIPPED | OUTPATIENT
Start: 2021-04-08

## 2021-04-08 RX ORDER — DICLOFENAC SODIUM 10 MG/G
2 GEL TOPICAL 2 TIMES DAILY
Qty: 20 G | Refills: 0 | Status: SHIPPED | OUTPATIENT
Start: 2021-04-08

## 2021-04-08 RX ADMIN — DICLOFENAC SODIUM 2 G: 10 GEL TOPICAL at 09:01

## 2021-04-08 RX ADMIN — BACITRACIN ZINC NEOMYCIN SULFATE POLYMYXIN B SULFATE: 400; 3.5; 5 OINTMENT TOPICAL at 09:01

## 2021-04-08 RX ADMIN — LAMOTRIGINE 25 MG: 25 TABLET ORAL at 08:34

## 2021-04-08 NOTE — BH NOTES
Behavioral Health Treatment Team Note      Patient goal(s) for today: continue taking meds as prescribe, engage in unit activities, participate in hygiene/ADLs, prepare for discharge, follow directions from staff  Treatment team focus/goals: continue adjusting meds as needed, discharge planning, update support system     Progress note: Patient presented with appropriate eye contact, cooperative attitude, congruent mood, clear speech, linear thought stream, appropriate content, denied SI/HI. Patient not able to have increase in lamictal due to only being on it a week. Patient discharge plan reviewed. She reported history of trying to follow up with [facility] but noted they have a wait to be seen. SW provided update on services available that do not require insurance. Patient's Medicaid pending and details for follow-up provided. Patient's father to transport her home. She reports no concerns regarding return to home. Discharge plan and safety plan reviewed. DHRUV spoke with patient's father to confirm discharge plan.  He is coming to  patient this afternoon.      XNR:  7                      KQHBOUPP LOS: 6-7     Insurance info/prescription coverage:  Self-Pay, Medicaid pending  Date of last family contact:  4/2/21- [facility] Nadira Fitzgerald [phone]  Family requesting physician contact today:  no  Discharge plan:  TBD  Guns in the home:  no   Outpatient provider(s):  To be linked to [facility]     Participating treatment team members: Fnu Geraldyne Schaumann, MSW, Alyse Ortiz NP
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
## 2021-04-08 NOTE — GROUP NOTE
DANA  GROUP DOCUMENTATION INDIVIDUAL Group Therapy Note Date: 4/8/2021 Group Start Time: 0900 Group End Time: 1000 Group Topic: Topic Group [facility] ACUTE [facility]AV [facility] [place], [address] GROUP DOCUMENTATION GROUP Group Therapy Note Attendees: 4 Attendance: Attended Patient's Goal:  To participate in self esteem booster Interventions/techniques: Supported-personal affirmations Follows Directions: Followed directions Interactions: Interacted appropriately Mental Status: Calm Behavior/appearance: Attentive, Cooperative and Needed prompting Goals Achieved: Able to engage in interactions and Able to listen to others Additional Notes:  Pt arrived late. Salvador Iqbal

## 2021-04-08 NOTE — PROGRESS NOTES
Problem: Suicide  Goal: *STG: Remains safe in hospital  Outcome: Progressing Towards Goal  Goal: *STG: Attends activities and groups  Outcome: Progressing Towards Goal  Goal: *STG/LTG: No longer expresses self destructive or suicidal thoughts  Outcome: Progressing Towards Goal       0700: Shift change report given to Avril FRANCIS (oncoming nurse) by Maria Elena Pepe (offgoing nurse). Report included the following information SBAR, Kardex, MAR and Recent Results. 2117-3252: Assumed care of patient. Patient is A&Ox4. Patient endorsed depression 7/10. Patient denied anxiety, SI, HI, AVH, and pain. Patient is calm, cooperative during assessment. Patient states that being hospitalized has helped her cope with the negative thoughts that race through her mind. Patient stated that going to the dayroom to color or attending groups helps keep her mind calm. Patient med and meal compliant. [id number]: Patient intermittently in morning group. Patient interacting appropriately with peers and staff.     [id number]: Discharge instructions reviewed with patient. Patient verbalized understanding.
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
## 2021-04-08 NOTE — PROGRESS NOTES
Problem: Suicide  Goal: *STG: Remains safe in hospital  Outcome: Progressing Towards Goal  Goal: *STG: Seeks staff when feelings of self harm or harm towards others arise  Outcome: Progressing Towards Goal  Goal: *STG: Attends activities and groups  Outcome: Progressing Towards Goal  Pt is visible on the unit. Denies s/I, h/I and a/v hallucinations denies depression and anxiety although pt does appear sad and depressed at times. No unsafe behavior noted or reported. Will continue to monitor. 0615 Observed in bed eyes closed with even unlabored breathing. Continued to rest for long intervals with no signs of distress or discomfort. Maintained on q15 min safety checks. Slept for 7 hrs.
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
## 2021-04-08 NOTE — DISCHARGE INSTRUCTIONS
DISCHARGE SUMMARY      Patient Education        Preventing Depression From Coming Back: Care Instructions  Overview     Some people have depression symptoms that come back. Depression often comes and goes during a lifetime. But there are many things you can do to keep it from coming back. Follow-up care is a key part of your treatment and safety. Be sure to make and go to all appointments, and call your doctor if you are having problems. It's also a good idea to know your test results and keep a list of the medicines you take. What do you need to know? Know your risk of depression coming back  Many things can make a person more likely to have depression again. These include having depression symptoms that continue after treatment, a previous episode of depression, and a history of childhood abuse or neglect. It is important to know your risk and to recognize warning signs of depression symptoms returning. Once you know these things, you will be better able to keep it from happening to you. Know the warning signs of depression returning  The two most common signs of depression coming back are:  · Feeling sad or hopeless. · Losing interest in your daily activities. You may have other symptoms, such as:  · You eat more or less than usual.  · You sleep too much or not enough. · You feel restless and unable to sit still. · You feel unable to move. · You feel tired all the time. · You feel unworthy or guilty without an obvious reason. · You have problems concentrating, remembering, or making decisions. · You think often about death or suicide. · You feel angry or have panic attacks. How can you care for yourself at home? · Take your medicine as prescribed. Call your doctor if you have any problems with your medicine. · Continue to take your medicine after your symptoms improve. Taking your medicine for at least 6 months after you feel better can help keep you from getting depressed again.  If your depression keeps coming back, your doctor may recommend you take medicine even longer. · Continue counseling. It may help prevent depression from returning, especially if you've had multiple episodes of depression. Talk with your counselor if you are having a hard time attending your sessions or you think the sessions aren't working. Don't just stop going. · Eat healthy foods. Include fruits, vegetables, beans, and whole grains in your diet each day. · Get regular exercise. Go for a walk or jog, ride your bike, or play sports with friends. · See your doctor right away if you have new symptoms or feel that your depression is coming back. · Keep a regular sleep schedule. Try for 8 hours of sleep a night. · Avoid using illegal drugs or marijuana and drinking alcohol. · If you or someone you know talks about suicide, self-harm, or feeling hopeless, get help right away. Call the [address] [place] [address] at 3-347-229-FSZP ([phone]) or text HOME to 366934 to access the Crisis Text Line. Consider saving these numbers in your phone. When should you call for help? Call 911 anytime you think you may need emergency care. For example, call if:    · You are thinking about suicide or are threatening suicide.     · You feel you cannot stop from hurting yourself or someone else.     · You hear or see things that aren't real.     · You think or speak in a bizarre way that is not like your usual behavior. Call your doctor now or seek immediate medical care if:    · You are drinking a lot of alcohol or using illegal drugs.     · You are talking or writing about death.    Watch closely for changes in your health, and be sure to contact your doctor if:    · You find it hard or it's getting harder to deal with school, a job, family, or friends.     · You think your treatment is not helping or you are not getting better.     · Your symptoms get worse or you get new symptoms.     · You have any problems with your antidepressant medicines, such as side effects, or you are thinking about stopping your medicine.     · You are having manic behavior, such as having very high energy, needing less sleep than normal, or showing risky behavior such as spending money you don't have or abusing others verbally or physically. Where can you learn more? Go to http://www.gray.com/  Enter C630 in the search box to learn more about \"Preventing Depression From Coming Back: Care Instructions. \"  Current as of: 2020               Content Version: 12.8  © 3801-6163 [employer]. Care instructions adapted under license by [employer] (which disclaims liability or warranty for this information). If you have questions about a medical condition or this instruction, always ask your healthcare professional. [place] [address] any warranty or liability for your use of this information. If I feel I am at risk of hurting myself or others, I will call the crisis office and speak with a crisis worker who will assist me during my crisis. [address]  [phone]  [address] [place] [address] [phone]  [address] [place] [address] 19   [address] [place] [address]  [phone]    NAME:Debo Vaz Angry  : 2000  MRN: [id number]    The patient Kamlesh Arevalo exhibits the ability to control behavior in a less restrictive environment. Patient's level of functioning is improving. No assaultive/destructive behavior has been observed for the past 24 hours. No suicidal/homicidal threat or behavior has been observed for the past 24 hours. There is no evidence of serious medication side effects. Patient has not been in physical or protective restraints for at least the past 24 hours. If weapons involved, how are they secured?  No weapons    Is patient aware of and in agreement with discharge plan? Patient agrees to return home and follow up with outpatient providers    Arrangements for medication:  Prescriptions given to patient    Copy of discharge instructions to provider?:  Fax to [address] for transportation home:  Father to transport    Keep all follow up appointments as scheduled, continue to take prescribed medications per physician instructions.   Mental health crisis number:  836 or your local mental health crisis line number at [phone]  Emergency WARM LINE      7-765-814-MHAV 1503)      M-F: 9am to 9pm      Sat & Sun: [address] [facility] suicide prevention lines:                             3-724-WCIJPPG (6-791-722-690-920-8930)       0-767-271-TALK (9-767-720-101-861-9319)   24/7 Crisis Text Line:  Text HOME to 098674

## 2022-03-20 PROBLEM — F33.2 MDD (MAJOR DEPRESSIVE DISORDER), RECURRENT EPISODE, SEVERE (HCC): Status: ACTIVE | Noted: 2021-04-03

## 2023-05-15 RX ORDER — TRAZODONE HYDROCHLORIDE 50 MG/1
50 TABLET ORAL
COMMUNITY
Start: 2021-04-08

## 2023-05-15 RX ORDER — HYDROXYZINE 50 MG/1
50 TABLET, FILM COATED ORAL 3 TIMES DAILY PRN
COMMUNITY
Start: 2021-04-08

## 2023-05-15 RX ORDER — OLANZAPINE 2.5 MG/1
2.5 TABLET ORAL EVERY EVENING
COMMUNITY
Start: 2021-04-08

## 2023-05-15 RX ORDER — LAMOTRIGINE 25 MG/1
25 TABLET ORAL DAILY
COMMUNITY
Start: 2021-04-09

## 2023-05-18 NOTE — PROGRESS NOTES
Problem: Suicide  Goal: *STG: Remains safe in hospital  Outcome: Progressing Towards Goal  Goal: *STG/LTG: Complies with medication therapy  Outcome: Progressing Towards Goal     Problem: Depressed Mood (Adult/Pediatric)  Goal: *STG: Participates in treatment plan  Outcome: Progressing Towards Goal  Goal: *STG: Remains safe in hospital  Outcome: Progressing Towards Goal  Goal: *STG: Complies with medication therapy  Outcome: Progressing Towards Goal   [id number] report received from Renetta Franco By Pass Patient sitting on side of her bed. Patient is calm and cooperative with assessment. Patient is withdrawn. Patient gave a thumbs up when asked how she was feeling today. AAOX3. Reoriented to place. Speech clear. Resp. Even and unlabored. NAD noted. Skin WNL for race except bruising to right side of face and abrasion to left elbow. Site to left elbow healing. Patient denies SI/HI and AVH. Patient reports anxiety and depression are getting better but are still a 5 at this time. Patient reports 3/10 pain to right side of head and wants Tylenol with scheduled AM medications. No other needs, wants, or concerns voiced at this time. Will continue to monitor. Patient spent most of the day in the dayroom today. Patient medicated for anxiety once today at 1413. No other complications noted this shift. show